# Patient Record
Sex: FEMALE | Race: BLACK OR AFRICAN AMERICAN | NOT HISPANIC OR LATINO | Employment: FULL TIME | RURAL
[De-identification: names, ages, dates, MRNs, and addresses within clinical notes are randomized per-mention and may not be internally consistent; named-entity substitution may affect disease eponyms.]

---

## 2020-08-12 ENCOUNTER — OFFICE VISIT (OUTPATIENT)
Dept: OTOLARYNGOLOGY | Facility: CLINIC | Age: 65
End: 2020-08-12

## 2020-08-12 ENCOUNTER — LAB (OUTPATIENT)
Dept: LAB | Facility: HOSPITAL | Age: 65
End: 2020-08-12

## 2020-08-12 VITALS — TEMPERATURE: 96.9 F | HEIGHT: 65 IN | BODY MASS INDEX: 33.15 KG/M2 | WEIGHT: 199 LBS

## 2020-08-12 DIAGNOSIS — K11.20 PAROTITIS: Primary | ICD-10-CM

## 2020-08-12 DIAGNOSIS — K11.23 CHRONIC PAROTITIS: ICD-10-CM

## 2020-08-12 DIAGNOSIS — K11.20 PAROTITIS: ICD-10-CM

## 2020-08-12 LAB
BASOPHILS # BLD AUTO: 0.03 10*3/MM3 (ref 0–0.2)
BASOPHILS NFR BLD AUTO: 0.3 % (ref 0–1.5)
DEPRECATED RDW RBC AUTO: 46.6 FL (ref 37–54)
EOSINOPHIL # BLD AUTO: 0.17 10*3/MM3 (ref 0–0.4)
EOSINOPHIL NFR BLD AUTO: 1.9 % (ref 0.3–6.2)
ERYTHROCYTE [DISTWIDTH] IN BLOOD BY AUTOMATED COUNT: 14.8 % (ref 12.3–15.4)
ERYTHROCYTE [SEDIMENTATION RATE] IN BLOOD: 33 MM/HR (ref 0–20)
HCT VFR BLD AUTO: 41.8 % (ref 34–46.6)
HGB BLD-MCNC: 13.1 G/DL (ref 12–15.9)
IMM GRANULOCYTES # BLD AUTO: 0.01 10*3/MM3 (ref 0–0.05)
IMM GRANULOCYTES NFR BLD AUTO: 0.1 % (ref 0–0.5)
LYMPHOCYTES # BLD AUTO: 2.34 10*3/MM3 (ref 0.7–3.1)
LYMPHOCYTES NFR BLD AUTO: 26.5 % (ref 19.6–45.3)
MCH RBC QN AUTO: 26.9 PG (ref 26.6–33)
MCHC RBC AUTO-ENTMCNC: 31.3 G/DL (ref 31.5–35.7)
MCV RBC AUTO: 85.8 FL (ref 79–97)
MONOCYTES # BLD AUTO: 1 10*3/MM3 (ref 0.1–0.9)
MONOCYTES NFR BLD AUTO: 11.3 % (ref 5–12)
NEUTROPHILS NFR BLD AUTO: 5.27 10*3/MM3 (ref 1.7–7)
NEUTROPHILS NFR BLD AUTO: 59.9 % (ref 42.7–76)
NRBC BLD AUTO-RTO: 0 /100 WBC (ref 0–0.2)
PLATELET # BLD AUTO: 261 10*3/MM3 (ref 140–450)
PMV BLD AUTO: 11.5 FL (ref 6–12)
RBC # BLD AUTO: 4.87 10*6/MM3 (ref 3.77–5.28)
WBC # BLD AUTO: 8.82 10*3/MM3 (ref 3.4–10.8)

## 2020-08-12 PROCEDURE — 99204 OFFICE O/P NEW MOD 45 MIN: CPT | Performed by: OTOLARYNGOLOGY

## 2020-08-12 PROCEDURE — 86038 ANTINUCLEAR ANTIBODIES: CPT | Performed by: OTOLARYNGOLOGY

## 2020-08-12 PROCEDURE — 85025 COMPLETE CBC W/AUTO DIFF WBC: CPT

## 2020-08-12 PROCEDURE — 85651 RBC SED RATE NONAUTOMATED: CPT

## 2020-08-12 PROCEDURE — 86235 NUCLEAR ANTIGEN ANTIBODY: CPT

## 2020-08-12 PROCEDURE — 82164 ANGIOTENSIN I ENZYME TEST: CPT

## 2020-08-12 PROCEDURE — 86225 DNA ANTIBODY NATIVE: CPT | Performed by: OTOLARYNGOLOGY

## 2020-08-12 RX ORDER — ALBUTEROL SULFATE 2.5 MG/3ML
SOLUTION RESPIRATORY (INHALATION)
COMMUNITY

## 2020-08-12 RX ORDER — ASPIRIN 81 MG/1
TABLET, CHEWABLE ORAL
COMMUNITY

## 2020-08-12 RX ORDER — LOSARTAN POTASSIUM 50 MG/1
TABLET ORAL
COMMUNITY

## 2020-08-12 RX ORDER — ASPIRIN 81 MG/1
TABLET, CHEWABLE ORAL
COMMUNITY
End: 2020-08-12 | Stop reason: SDUPTHER

## 2020-08-12 RX ORDER — HYDROCODONE BITARTRATE AND ACETAMINOPHEN 10; 325 MG/1; MG/1
TABLET ORAL
COMMUNITY

## 2020-08-12 RX ORDER — CYCLOBENZAPRINE HCL 10 MG
TABLET ORAL
COMMUNITY

## 2020-08-12 RX ORDER — ESCITALOPRAM OXALATE 20 MG/1
TABLET ORAL
COMMUNITY

## 2020-08-12 RX ORDER — DIPHENHYDRAMINE HCL 25 MG
TABLET ORAL
COMMUNITY
End: 2020-08-12

## 2020-08-12 RX ORDER — IBUPROFEN 800 MG/1
TABLET ORAL
COMMUNITY

## 2020-08-12 RX ORDER — METOPROLOL TARTRATE 50 MG/1
TABLET, FILM COATED ORAL
COMMUNITY

## 2020-08-12 RX ORDER — FLUTICASONE PROPIONATE 50 MCG
SPRAY, SUSPENSION (ML) NASAL
COMMUNITY

## 2020-08-12 RX ORDER — LORATADINE 10 MG/1
TABLET ORAL
COMMUNITY

## 2020-08-12 RX ORDER — ALBUTEROL SULFATE 90 UG/1
AEROSOL, METERED RESPIRATORY (INHALATION)
COMMUNITY

## 2020-08-12 RX ORDER — CLOTRIMAZOLE 1 %
CREAM (GRAM) TOPICAL
COMMUNITY

## 2020-08-12 RX ORDER — FEXOFENADINE HCL 180 MG/1
TABLET ORAL
COMMUNITY

## 2020-08-12 NOTE — PATIENT INSTRUCTIONS
MyPlate from USDA    MyPlate is an outline of a general healthy diet based on the 2010 Dietary Guidelines for Americans, from the U.S. Department of Agriculture (USDA). It sets guidelines for how much food you should eat from each food group based on your age, sex, and level of physical activity.  What are tips for following MyPlate?  To follow MyPlate recommendations:  · Eat a wide variety of fruits and vegetables, grains, and protein foods.  · Serve smaller portions and eat less food throughout the day.  · Limit portion sizes to avoid overeating.  · Enjoy your food.  · Get at least 150 minutes of exercise every week. This is about 30 minutes each day, 5 or more days per week.  It can be difficult to have every meal look like MyPlate. Think about MyPlate as eating guidelines for an entire day, rather than each individual meal.  Fruits and vegetables  · Make half of your plate fruits and vegetables.  · Eat many different colors of fruits and vegetables each day.  · For a 2,000 calorie daily food plan, eat:  ? 2½ cups of vegetables every day.  ? 2 cups of fruit every day.  · 1 cup is equal to:  ? 1 cup raw or cooked vegetables.  ? 1 cup raw fruit.  ? 1 medium-sized orange, apple, or banana.  ? 1 cup 100% fruit or vegetable juice.  ? 2 cups raw leafy greens, such as lettuce, spinach, or kale.  ? ½ cup dried fruit.  Grains  · One fourth of your plate should be grains.  · Make at least half of the grains you eat each day whole grains.  · For a 2,000 calorie daily food plan, eat 6 oz of grains every day.  · 1 oz is equal to:  ? 1 slice bread.  ? 1 cup cereal.  ? ½ cup cooked rice, cereal, or pasta.  Protein  · One fourth of your plate should be protein.  · Eat a wide variety of protein foods, including meat, poultry, fish, eggs, beans, nuts, and tofu.  · For a 2,000 calorie daily food plan, eat 5½ oz of protein every day.  · 1 oz is equal to:  ? 1 oz meat, poultry, or fish.  ? ¼ cup cooked beans.  ? 1 egg.  ? ½ oz nuts  or seeds.  ? 1 Tbsp peanut butter.  Dairy  · Drink fat-free or low-fat (1%) milk.  · Eat or drink dairy as a side to meals.  · For a 2,000 calorie daily food plan, eat or drink 3 cups of dairy every day.  · 1 cup is equal to:  ? 1 cup milk, yogurt, cottage cheese, or soy milk (soy beverage).  ? 2 oz processed cheese.  ? 1½ oz natural cheese.  Fats, oils, salt, and sugars  · Only small amounts of oils are recommended.  · Avoid foods that are high in calories and low in nutritional value (empty calories), like foods high in fat or added sugars.  · Choose foods that are low in salt (sodium). Choose foods that have less than 140 milligrams (mg) of sodium per serving.  · Drink water instead of sugary drinks. Drink enough water each day to keep your urine pale yellow.  Where to find support  · Work with your health care provider or a nutrition specialist (dietitian) to develop a customized eating plan that is right for you.  · Download an carlos (mobile application) to help you track your daily food intake.  Where to find more information  · Go to ChooseMyPlate.gov for more information.  · Learn more and log your daily food intake according to MyPlate using USDA's [employer]: www.[employer]gov  Summary  · MyPlate is a general guideline for healthy eating from the USDA. It is based on the 2010 Dietary Guidelines for Americans.  · In general, fruits and vegetables should take up ½ of your plate, grains should take up ¼ of your plate, and protein should take up ¼ of your plate.  This information is not intended to replace advice given to you by your health care provider. Make sure you discuss any questions you have with your health care provider.  Document Released: 01/06/2009 Document Revised: 01/19/2019 Document Reviewed: 03/19/2018  [employer] Patient Education © 2020 [employer]  Salivary Gland Infection    A salivary gland infection is an infection in one or more of the glands that produce saliva. You have six  major salivary glands. Each gland has a duct that carries saliva into your mouth. Saliva keeps your mouth moist and breaks down the food that you eat. It also helps prevent tooth decay.  Two salivary glands are located just in front of your ears (parotid). The ducts for these glands open up inside your cheeks, near your back teeth. You also have two glands under your tongue (sublingual) and two glands under your jaw (submandibular). The ducts for these glands open under your tongue. Any salivary gland can become infected. Most infections occur in the parotid glands or submandibular glands.  What are the causes?  This condition may be caused by bacteria or viruses.  · The bacteria that cause salivary gland infections are usually the same bacteria that normally live in your mouth.  ? A stone can form in a salivary gland and block the flow of saliva. As a result, saliva backs up into the salivary gland. Bacteria may then start to grow behind the blockage and cause infection.  ? Bacterial infections usually cause pain and swelling on one side of the face. Submandibular gland swelling occurs under the jaw. Parotid swelling occurs in front of the ear.  ? Bacterial infections are more common in adults.  · The mumps virus is the most common cause of viral salivary gland infections. However, mumps is now rare because of vaccination.  ? This infection causes swelling in both parotid glands.  ? Viral infections are more common in children.  What increases the risk?  The following factors may make you more likely to develop a bacterial infection:  · Not taking good care of your mouth and teeth (poor oral hygiene).  · Smoking.  · Not drinking enough water.  · Having a disease that causes dry mouth and dry eyes (Mikulicz syndrome or Sjogren syndrome).  A viral infection is more likely to occur in children who do not get the MMR (measles, mumps, rubella) vaccine.  What are the signs or symptoms?  The main sign of a salivary gland  infection is a swollen salivary gland. This type of inflammation is often called sialadenitis. You may have swelling in front of your ear, under your jaw, or under your tongue. Swelling may get worse when you eat and decrease after you eat. Other signs and symptoms include:  · Pain.  · Tenderness.  · Redness.  · Dry mouth.  · Bad taste in your mouth.  · Difficulty chewing and swallowing.  · Fever.  How is this diagnosed?  This condition may be diagnosed based on:  · Your signs and symptoms.  · A physical exam. During the exam, your health care provider will look and feel inside your mouth to see whether a stone is blocking a salivary gland duct.  · Tests, such as:  ? An X-ray to check for a stone.  ? An ultrasound, CT scan, or MRI to look for an abscess and to rule out other causes of swelling.  ? A culture and sensitivity test. In this test, a sample of pus is taken from the salivary gland with a swab or by using a needle (aspiration). The sample is tested in a lab to determine the type of bacteria that is growing and which antibiotic medicines will work against it.  You may need to see an ear, nose, and throat specialist (ENT or otolaryngologist) for diagnosis and treatment.  How is this treated?  Viral salivary gland infections usually clear up without treatment. Bacterial infections are usually treated with antibiotic medicine. Severe infections that cause difficulty with swallowing may be treated with an IV antibiotic in the hospital.  Other treatments may include:  · Probing and widening the salivary duct to allow a stone to pass. In some cases, a thin, flexible scope (endoscope) may be inserted into the duct to find a stone and remove it.  · Breaking up a stone using sound waves.  · Draining an infected gland (abscess) with a needle.  · Surgery to:  ? Remove a stone.  ? Drain pus from an abscess.  ? Remove a badly infected gland.  Follow these instructions at home:    Medicines  · Take over-the-counter and  prescription medicines only as told by your health care provider.  · If you were prescribed an antibiotic medicine, take it as told by your health care provider. Do not stop taking the antibiotic even if you start to feel better.  To relieve discomfort  · Follow these instructions every few hours:  ? Suck on a lemon candy to stimulate the flow of saliva.  ? Put a warm compress over the gland.  ? Gently massage the gland.  · Rinse your mouth with a salt-water mixture 3-4 times a day or as needed. To make a salt-water mixture, completely dissolve ½-1 tsp of salt in 1 cup of warm water.  General instructions  · Practice good oral hygiene by brushing and flossing your teeth after meals and before you go to bed.  · Drink enough fluid to keep your urine pale yellow.  · Do not use any products that contain nicotine or tobacco, such as cigarettes and e-cigarettes. If you need help quitting, ask your health care provider.  · Keep all follow-up visits as told by your health care provider. This is important.  Contact a health care provider if:  · You have pain and swelling in your face, jaw, or mouth after eating.  · You have persistent swelling in any of these places:  ? In front of your ear.  ? Under your jaw.  ? Inside your mouth.  Get help right away if:  · You have pain and swelling in your face, jaw, or mouth, and this is getting worse.  · Your pain and swelling make it hard to swallow or breathe.  Summary  · A salivary gland infection is an infection in one or more of the glands that produce saliva. You have six major salivary glands. Each gland has a duct that carries saliva into your mouth.  · Any salivary gland can become infected. Most infections occur in the glands just in front of your ears (parotid glands) or the glands under your jaw (submandibular glands).  · This condition may be caused by bacteria or viruses.  · Salivary gland infections caused by a virus usually clear up without treatment. Bacterial  infections are usually treated with antibiotic medicine.  This information is not intended to replace advice given to you by your health care provider. Make sure you discuss any questions you have with your health care provider.  Document Released: 01/25/2006 Document Revised: 01/16/2019 Document Reviewed: 01/16/2019  [employer] Patient Education © 2020 [employer]

## 2020-08-12 NOTE — PROGRESS NOTES
Subjective   Quinn Mccann is a 65 y.o. female.   Swelling of face  History of Present Illness   Patient states she has had intermittent swelling of her face for 25 years is been both sides more recently is become more consistent sometimes this is daily she knows it improved with drinking fluids and taking sialagogues.  She is not had any imaging or any other testing done.  She is on some antihistamines she is not aware that those of anything worse but she is not clear as to why this is happening more frequently than it had been she currently does not have any swelling or pain but becomes uncomfortable when it happens.  She is not have any fever or chills she denies any other new medications she denies any trauma to the area denies any prior surgery to the facial area      The following portions of the patient's history were reviewed and updated as appropriate: allergies, current medications, past family history, past medical history, past social history, past surgical history and problem list.      Quinn Mccann reports that she has never smoked. She has never used smokeless tobacco. She reports that she drinks alcohol. She reports that she does not use drugs.  Patient is not a tobacco user and has not been counseled for use of tobacco products    Family History   Problem Relation Age of Onset   • Diabetes Other    • Heart disease Other    • Breast cancer Other    • Asthma Other    • Hypertension Other    • Hyperlipidemia Other    • Thyroid disease Other          Current Outpatient Medications:   •  albuterol (PROVENTIL) (2.5 MG/3ML) 0.083% nebulizer solution, albuterol sulfate 2.5 mg/3 mL (0.083 %) solution for nebulization, Disp: , Rfl:   •  albuterol sulfate HFA (Ventolin HFA) 108 (90 Base) MCG/ACT inhaler, Ventolin HFA 90 mcg/actuation aerosol inhaler, Disp: , Rfl:   •  aspirin 81 MG chewable tablet, aspirin 81 mg chewable tablet  Chew 2 tablets every day by oral route., Disp: , Rfl:   •  Cetirizine  HCl 10 MG capsule, cetirizine 10 mg capsule  Take 1 capsule every day by oral route as needed., Disp: , Rfl:   •  clotrimazole (LOTRIMIN) 1 % cream, clotrimazole 1 % topical cream, Disp: , Rfl:   •  cyclobenzaprine (FLEXERIL) 10 MG tablet, cyclobenzaprine 10 mg tablet, Disp: , Rfl:   •  diclofenac (Voltaren) 1 % gel gel, Voltaren 1 % topical gel, Disp: , Rfl:   •  escitalopram (LEXAPRO) 20 MG tablet, escitalopram 20 mg tablet  TAKE 1 TABLET BY MOUTH ONCE DAILY, Disp: , Rfl:   •  fexofenadine (Allegra Allergy) 180 MG tablet, Allegra Allergy 180 mg tablet  Take 1 tablet every day by oral route., Disp: , Rfl:   •  fluticasone (FLONASE) 50 MCG/ACT nasal spray, fluticasone propionate 50 mcg/actuation nasal spray,suspension, Disp: , Rfl:   •  HYDROcodone-acetaminophen (NORCO)  MG per tablet, hydrocodone 10 mg-acetaminophen 325 mg tablet, Disp: , Rfl:   •  ibuprofen (ADVIL,MOTRIN) 800 MG tablet, ibuprofen 800 mg tablet, Disp: , Rfl:   •  loratadine (CLARITIN) 10 MG tablet, loratadine 10 mg tablet  TAKE 1 TABLET BY MOUTH ONCE DAILY AS NEEDED, Disp: , Rfl:   •  losartan (COZAAR) 50 MG tablet, losartan 50 mg tablet  TAKE 1 TABLET BY MOUTH ONCE DAILY, Disp: , Rfl:   •  metoprolol tartrate (LOPRESSOR) 50 MG tablet, metoprolol tartrate 50 mg tablet  Take 1 tablet by mouth twice daily, Disp: , Rfl:     Allergies   Allergen Reactions   • Amoxicillin Hives     Difficulty With Eyesight   • Erythromycin Hives     Difficulty With Eyesight   • Penicillins Hives     Difficulty With Eyesight       Past Medical History:   Diagnosis Date   • Asthma    • Hypertension    • MVP (mitral valve prolapse)        Past Surgical History:   Procedure Laterality Date   • HAND SURGERY      Bilateral Hands       Review of Systems   Constitutional: Negative for fever.   HENT: Positive for hearing loss, mouth sores and trouble swallowing.    Respiratory: Positive for shortness of breath.    Cardiovascular: Positive for chest pain.   Musculoskeletal:  Positive for back pain.        Arthritis     Neurological: Positive for facial asymmetry. Negative for dizziness.   All other systems reviewed and are negative.          Objective   Physical Exam   Constitutional: She appears well-developed and well-nourished.   HENT:   Head: Normocephalic.       Right Ear: External ear normal.   Left Ear: External ear normal.   Nose: Nose normal.   Mouth/Throat: Uvula is midline and oropharynx is clear and moist.       Eyes: Conjunctivae are normal.   Neck: Neck supple. No thyromegaly present.       Pulmonary/Chest: Effort normal.   Lymphadenopathy:     She has no cervical adenopathy.   Neurological: She is alert.   Skin: Skin is warm.   Psychiatric: She has a normal mood and affect. Thought content normal.   Nursing note and vitals reviewed.            Assessment/Plan   Quinn was seen today for recurrent sialadenitis.    Diagnoses and all orders for this visit:    Parotitis  -     CBC & Differential; Future  -     Angiotensin Converting Enzyme; Future  -     Sedimentation Rate; Future  -     Sjogren's Antibody, Anti-SS-A / -SS-B; Future  -     SHE    Chronic parotitis  -     CBC & Differential; Future  -     Angiotensin Converting Enzyme; Future  -     Sedimentation Rate; Future  -     Sjogren's Antibody, Anti-SS-A / -SS-B; Future  -     SHE    Concerned the patient may have an underlying condition such as an autoimmune problem that may be causing her symptoms.  Talked her about increasing her fluid intake by several glasses of fluid and minimizing caffeine and avoiding antihistamines  Before we consider steroids which she has been on for other reasons which she does not want to use will avoid that situation.  Other options include imaging though with this history is unlikely imaging would show anything.  I explained her some of the pathophysiology is that she may need referral to a an immunologist based on what we find we will see what the tests show and look for evidence of  autoimmune disease and call her with the results and then plan further therapy.  She is currently asymptomatic and has no physical findings

## 2020-08-13 LAB
ACE SERPL-CCNC: 69 U/L (ref 14–82)
ANA SER QL: POSITIVE
DSDNA AB SER-ACNC: <1 IU/ML (ref 0–9)
ENA SS-A AB SER-ACNC: 0.8 AI (ref 0–0.9)
ENA SS-B AB SER-ACNC: 0.5 AI (ref 0–0.9)
Lab: NORMAL

## 2020-08-17 DIAGNOSIS — K11.20 PAROTITIS: Primary | ICD-10-CM

## 2020-08-24 ENCOUNTER — TELEPHONE (OUTPATIENT)
Dept: OTOLARYNGOLOGY | Facility: CLINIC | Age: 65
End: 2020-08-24

## 2020-08-24 NOTE — TELEPHONE ENCOUNTER
NO VOICE MAIL SET UP WITH NO ANSWER, I TRIED 3 TIMES TO CALL PATIENT ABOUT REFERRAL TO RHEUMATOLOGIST, WITH NO LUCK.

## 2020-09-16 ENCOUNTER — OFFICE VISIT (OUTPATIENT)
Dept: OTOLARYNGOLOGY | Facility: CLINIC | Age: 65
End: 2020-09-16

## 2020-09-16 VITALS — WEIGHT: 199 LBS | TEMPERATURE: 97.7 F | BODY MASS INDEX: 33.15 KG/M2 | HEIGHT: 65 IN

## 2020-09-16 DIAGNOSIS — M26.623 BILATERAL TEMPOROMANDIBULAR JOINT PAIN: Primary | ICD-10-CM

## 2020-09-16 PROCEDURE — 99213 OFFICE O/P EST LOW 20 MIN: CPT | Performed by: OTOLARYNGOLOGY

## 2020-09-16 NOTE — PROGRESS NOTES
Subjective   Quinn Mccann is a 65 y.o. female.     Jaw pain  History of Present Illness   Patient comes in a slightly different story saying that this is more of pain in her jaw that is associated with eating but not really so much swelling she has no hearing loss no ear drainage no fever chills occurs severely once a week she is already on anti-inflammatory medicines for other reasons she notes that when she chews from one side the other causes pain the other side      The following portions of the patient's history were reviewed and updated as appropriate: allergies, current medications, past family history, past medical history, past social history, past surgical history and problem list.      Current Outpatient Medications:   •  albuterol (PROVENTIL) (2.5 MG/3ML) 0.083% nebulizer solution, As Needed, Disp: , Rfl:   •  albuterol sulfate HFA (Ventolin HFA) 108 (90 Base) MCG/ACT inhaler, Ventolin HFA 90 mcg/actuation aerosol inhaler, Disp: , Rfl:   •  aspirin 81 MG chewable tablet, aspirin 81 mg chewable tablet  Chew 2 tablets every day by oral route., Disp: , Rfl:   •  Cetirizine HCl 10 MG capsule, cetirizine 10 mg capsule  Take 1 capsule every day by oral route as needed., Disp: , Rfl:   •  clotrimazole (LOTRIMIN) 1 % cream, clotrimazole 1 % topical cream, Disp: , Rfl:   •  cyclobenzaprine (FLEXERIL) 10 MG tablet, cyclobenzaprine 10 mg tablet, Disp: , Rfl:   •  diclofenac (Voltaren) 1 % gel gel, Voltaren 1 % topical gel, Disp: , Rfl:   •  escitalopram (LEXAPRO) 20 MG tablet, escitalopram 20 mg tablet  TAKE 1 TABLET BY MOUTH ONCE DAILY, Disp: , Rfl:   •  fexofenadine (Allegra Allergy) 180 MG tablet, Allegra Allergy 180 mg tablet  Take 1 tablet every day by oral route., Disp: , Rfl:   •  fluticasone (FLONASE) 50 MCG/ACT nasal spray, fluticasone propionate 50 mcg/actuation nasal spray,suspension, Disp: , Rfl:   •  ibuprofen (ADVIL,MOTRIN) 800 MG tablet, ibuprofen 800 mg tablet, Disp: , Rfl:   •  loratadine  (CLARITIN) 10 MG tablet, loratadine 10 mg tablet  TAKE 1 TABLET BY MOUTH ONCE DAILY AS NEEDED, Disp: , Rfl:   •  losartan (COZAAR) 50 MG tablet, losartan 50 mg tablet  TAKE 1 TABLET BY MOUTH ONCE DAILY, Disp: , Rfl:   •  metoprolol tartrate (LOPRESSOR) 50 MG tablet, metoprolol tartrate 50 mg tablet  Take 1 tablet by mouth twice daily, Disp: , Rfl:   •  HYDROcodone-acetaminophen (NORCO)  MG per tablet, hydrocodone 10 mg-acetaminophen 325 mg tablet, Disp: , Rfl:     Allergies   Allergen Reactions   • Amoxicillin Hives     Difficulty With Eyesight   • Erythromycin Hives     Difficulty With Eyesight   • Penicillins Hives     Difficulty With Eyesight             Review of Systems   Constitutional: Negative for fever.   HENT: Negative for sore throat, trouble swallowing and voice change.         Jaw pain left worse than right   Hematological: Negative for adenopathy.           Objective   Physical Exam  Vitals signs and nursing note reviewed.   HENT:      Head:      Comments: Bilaterally tender TMJs left more than right especially with opening closing parotid not tender     Right Ear: Tympanic membrane, ear canal and external ear normal.      Left Ear: Ear canal and external ear normal.      Mouth/Throat:      Mouth: Mucous membranes are moist.      Comments: Clear saliva per ducts with no palpable mass in the parotid or intraorally  Eyes:      Extraocular Movements: Extraocular movements intact.      Conjunctiva/sclera: Conjunctivae normal.   Skin:     General: Skin is warm.   Neurological:      General: No focal deficit present.      Mental Status: She is alert.   Psychiatric:         Mood and Affect: Mood normal.             Assessment/Plan   Quinn was seen today for 4 week clinical appointment.    Diagnoses and all orders for this visit:    Bilateral temporomandibular joint pain  -     Ambulatory Referral to Oral Maxillofacial Surgery    Further discussions have the patient is not really having parotid  swelling but may have muscle spasm related to her TMJ she clearly states is worse with eating and it does not always swell she has pain that can be severe at least once a week it is in both jaws but worse on the left than the right he is not having parotid swelling per se and has no clinical evidence of parotitis deftly has TMJ tenderness there is no obvious ear problem throat problem or neck problem otherwise  Her biggest complaint is not swelling but pain  She is already on anti-inflammatories and will add any more to that did talk to her about a bite block and seeing a jaw specialist I told her there are not many in the area she wants to look at [facility] otherwise suggested [facility] may be an option I did offer imaging but she was to wait till she sees them she is to call if lack of improvement I gave her a card with my phone number to call if she is not improving with her treatment